# Patient Record
Sex: MALE | Race: WHITE | NOT HISPANIC OR LATINO | Employment: OTHER | URBAN - METROPOLITAN AREA
[De-identification: names, ages, dates, MRNs, and addresses within clinical notes are randomized per-mention and may not be internally consistent; named-entity substitution may affect disease eponyms.]

---

## 2017-04-11 ENCOUNTER — OFFICE VISIT (OUTPATIENT)
Dept: OPTOMETRY | Facility: CLINIC | Age: 82
End: 2017-04-11
Payer: MEDICARE

## 2017-04-11 DIAGNOSIS — H16.143 SUPERFICIAL PUNCTATE KERATITIS OF BOTH EYES: ICD-10-CM

## 2017-04-11 DIAGNOSIS — H16.212 EXPOSURE KERATOPATHY, LEFT: ICD-10-CM

## 2017-04-11 DIAGNOSIS — H35.30 AGE-RELATED MACULAR DEGENERATION: ICD-10-CM

## 2017-04-11 DIAGNOSIS — H25.13 NUCLEAR SCLEROSIS, BILATERAL: Primary | ICD-10-CM

## 2017-04-11 DIAGNOSIS — H52.7 REFRACTIVE ERROR: ICD-10-CM

## 2017-04-11 DIAGNOSIS — H34.8192: ICD-10-CM

## 2017-04-11 PROCEDURE — 92015 DETERMINE REFRACTIVE STATE: CPT | Mod: S$GLB,,, | Performed by: OPTOMETRIST

## 2017-04-11 PROCEDURE — 92004 COMPRE OPH EXAM NEW PT 1/>: CPT | Mod: S$GLB,,, | Performed by: OPTOMETRIST

## 2017-04-11 PROCEDURE — 99999 PR PBB SHADOW E&M-EST. PATIENT-LVL II: CPT | Mod: PBBFAC,,, | Performed by: OPTOMETRIST

## 2017-04-11 RX ORDER — NITROGLYCERIN 0.3 MG/1
0.3 TABLET SUBLINGUAL EVERY 5 MIN PRN
COMMUNITY

## 2017-04-11 RX ORDER — CLOTRIMAZOLE 10 MG/1
10 LOZENGE ORAL; TOPICAL
COMMUNITY

## 2017-04-11 RX ORDER — ERGOCALCIFEROL 1.25 MG/1
CAPSULE ORAL
Refills: 4 | COMMUNITY
Start: 2017-02-27

## 2017-04-11 RX ORDER — METOLAZONE 2.5 MG/1
TABLET ORAL
Refills: 1 | COMMUNITY
Start: 2017-02-17

## 2017-04-11 RX ORDER — ASPIRIN 325 MG/1
81 TABLET, FILM COATED ORAL DAILY
COMMUNITY

## 2017-04-11 RX ORDER — MUPIROCIN 20 MG/G
OINTMENT TOPICAL 3 TIMES DAILY
COMMUNITY

## 2017-04-11 RX ORDER — SPIRONOLACTONE 25 MG/1
TABLET ORAL
Refills: 3 | COMMUNITY
Start: 2017-01-28

## 2017-04-11 RX ORDER — POTASSIUM CHLORIDE 750 MG/1
TABLET, EXTENDED RELEASE ORAL
COMMUNITY
Start: 2017-01-23

## 2017-04-11 RX ORDER — PRAVASTATIN SODIUM 40 MG/1
TABLET ORAL
Refills: 2 | COMMUNITY
Start: 2017-03-30

## 2017-04-11 RX ORDER — METOPROLOL SUCCINATE 25 MG/1
TABLET, EXTENDED RELEASE ORAL
Refills: 3 | COMMUNITY
Start: 2017-01-28

## 2017-04-11 RX ORDER — CLOPIDOGREL BISULFATE 75 MG/1
TABLET ORAL
Refills: 3 | COMMUNITY
Start: 2017-01-09

## 2017-04-11 RX ORDER — CIPROFLOXACIN 250 MG/1
TABLET, FILM COATED ORAL
Refills: 1 | COMMUNITY
Start: 2017-03-22

## 2017-04-12 NOTE — PROGRESS NOTES
Subjective:       Patient ID: Ray Latham is a 87 y.o. male      Chief Complaint   Patient presents with    Concerns About Ocular Health    Macular Degeneration     History of Present Illness  Dls: ? 5 yrs     Pt here today with his daughter.  Pt daughter states he is pulling out lashes. Pt c/o blurry vision at   distance and near ou. Pt wears bifocal glasses rarely. Pt c/o dry eyes ou   no ha's no floaters.     Eye meds:   Tears ou   Refresh pm qhs os    H/o popped blood vessel OS x 15+ years ago causing decreased VA - ? CRVO     Assessment/Plan:     1. Nuclear sclerosis, bilateral  Visually significant cataract OU. Daughter states that PCP advised against surgery unless if not necessary. Discussed with daughter that cataracts are impacting his vision but due to pt health they decline surgery consult. Will monitor for now.    2. Age-related macular degeneration  Geographic atrophy OU. Unable to obtain OCT due to poor patient cooperation. Discussed with daughter that AMD has also caused reduced in VA and even with cataract surgery, unsure how much visual improvement patient may gain.      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 Pt has damage to nerve preventing him from closing his eyelid. He has been using ointment at night and drops throughout day and doing well. Discussed options of lid revision or tarsorrhaphy and pt and daughter declined. Pt will continue with drops and ointment at night.      4. Superficial punctate keratitis of both eyes  Pt has been picking at eyelashes OD thinking something is in his eye. Artificial tears in both eyes, ointment in both eyes at night.     5. H/O retinal vein occlusion  Per daughter, pt had a blood vessel pop in his left eye x 15+ years ago leading to decreased VA. Discussed with pt and daughter that unlikely any visual improvement with cataract surgery. Blind eye. Recommend monocular precautions.    6. Refractive error  Loose lens refraction. Pt low vision due to  significant cataracts/advanced AMD/CRVO OS. Daughter states they just want to improve his vision as much as possible with glasses to help him play cards and watch TV. They are aware his vision is poor and just want to make him comfortable.    Eyeglass Final Rx     Eyeglass Final Rx      Sphere Cylinder Add   Right -2.00 Sphere +5.00   Left -2.00 Sphere +5.00       Type:  Bifocal    Expiration Date:  4/12/2018                  Return if symptoms worsen or fail to improve.